# Patient Record
Sex: FEMALE | Race: WHITE | NOT HISPANIC OR LATINO | Employment: FULL TIME | URBAN - METROPOLITAN AREA
[De-identification: names, ages, dates, MRNs, and addresses within clinical notes are randomized per-mention and may not be internally consistent; named-entity substitution may affect disease eponyms.]

---

## 2023-12-15 ENCOUNTER — ANCILLARY PROCEDURE (OUTPATIENT)
Dept: RADIOLOGY | Facility: CLINIC | Age: 58
End: 2023-12-15
Payer: COMMERCIAL

## 2023-12-15 ENCOUNTER — OFFICE VISIT (OUTPATIENT)
Dept: ORTHOPEDIC SURGERY | Facility: CLINIC | Age: 58
End: 2023-12-15
Payer: COMMERCIAL

## 2023-12-15 DIAGNOSIS — S93.492A SPRAIN OF ANTERIOR TALOFIBULAR LIGAMENT, LEFT, INITIAL ENCOUNTER: ICD-10-CM

## 2023-12-15 DIAGNOSIS — S92.352A CLOSED FRACTURE OF BASE OF FIFTH METATARSAL BONE, LEFT, INITIAL ENCOUNTER: ICD-10-CM

## 2023-12-15 DIAGNOSIS — S86.302A PERONEAL TENDON INJURY, LEFT, INITIAL ENCOUNTER: Primary | ICD-10-CM

## 2023-12-15 PROCEDURE — 73610 X-RAY EXAM OF ANKLE: CPT | Mod: LT,FY

## 2023-12-15 PROCEDURE — 73630 X-RAY EXAM OF FOOT: CPT | Mod: LT,FY

## 2023-12-15 PROCEDURE — 1036F TOBACCO NON-USER: CPT | Performed by: STUDENT IN AN ORGANIZED HEALTH CARE EDUCATION/TRAINING PROGRAM

## 2023-12-15 PROCEDURE — 99213 OFFICE O/P EST LOW 20 MIN: CPT | Performed by: STUDENT IN AN ORGANIZED HEALTH CARE EDUCATION/TRAINING PROGRAM

## 2023-12-15 RX ORDER — VALACYCLOVIR HYDROCHLORIDE 1 G/1
TABLET, FILM COATED ORAL
COMMUNITY
Start: 2023-05-01 | End: 2024-01-12 | Stop reason: WASHOUT

## 2023-12-15 RX ORDER — ALPRAZOLAM 1 MG/1
0.5 TABLET ORAL
COMMUNITY
Start: 2023-11-22 | End: 2024-02-20

## 2023-12-15 RX ORDER — TIZANIDINE 2 MG/1
2-4 TABLET ORAL
COMMUNITY
Start: 2023-01-17

## 2023-12-15 RX ORDER — APREMILAST 10-20-30MG
KIT ORAL
COMMUNITY
Start: 2023-08-11

## 2023-12-15 RX ORDER — TRAMADOL HYDROCHLORIDE 50 MG/1
50 TABLET ORAL EVERY 8 HOURS PRN
COMMUNITY
Start: 2023-10-09

## 2023-12-15 RX ORDER — CETIRIZINE HYDROCHLORIDE 10 MG/1
10 TABLET ORAL DAILY
COMMUNITY
Start: 2023-06-08

## 2023-12-15 RX ORDER — ALBUTEROL SULFATE 90 UG/1
1-2 AEROSOL, METERED RESPIRATORY (INHALATION) EVERY 4 HOURS PRN
COMMUNITY
Start: 2023-06-23

## 2023-12-15 RX ORDER — GABAPENTIN 300 MG/1
300 CAPSULE ORAL NIGHTLY
COMMUNITY
Start: 2023-11-28

## 2023-12-15 ASSESSMENT — PAIN SCALES - GENERAL: PAINLEVEL_OUTOF10: 3

## 2023-12-15 ASSESSMENT — PAIN DESCRIPTION - DESCRIPTORS: DESCRIPTORS: ACHING;SORE

## 2023-12-15 ASSESSMENT — PAIN - FUNCTIONAL ASSESSMENT: PAIN_FUNCTIONAL_ASSESSMENT: 0-10

## 2023-12-15 NOTE — PROGRESS NOTES
ORTHOPAEDIC SURGERY OUTPATIENT PROGRESS NOTE    Chief Complaint:  Left foot pain    History Of Present Illness  58-year-old female presenting for evaluation of left foot pain as a referral from Dr. Chow. Patient reports that on 06/25/2023 she stepped awkwardly off her deck while carrying her 2-year-old granddaughter and suffered an inversion type injury to her foot and ankle. She was able to bear weight but with great difficulty after. She experienced severe, 10 out of 10 pain. She currently rates her pain a 6-7 out of 10. She has been taking Motrin without relief. She was seen in urgent care setting where x-rays were obtained and the patient was made protected weightbearing with crutches. Patient states that her pain fluctuates, usually worsening by the end of the day with associated swelling that has overall improved. Her bruising has also improved.     Patient has psoriatic arthritis in her feet. 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     Patient is a current non-smoker, nondiabetic and does not use any anticoagulation.     Please see detailed patient history sheet for further information.     08/11/2023: Patient returns for follow-up of her left foot and ankle pain. Patient reports minimal pain. She has been compliant with weightbearing restrictions utilizing a walking boot and a compression sock. She has only had an occasional ache or discomfort, typically with increased walking or standing.     09/22/2023: Patient returns for follow-up of her left foot and ankle pain. Overall, her foot pain has declined considerably. She has successfully transition back to regular shoewear. She has noticed some burning pain radiating down the outside of her foot and is having continued difficulty with walking down stairs with a pain on the outside of her ankle. Pain is rated as mild to moderate. She does not have any current limitations in her activities of daily  living, she continues to run her [employer] business and is on her feet extensively throughout the workday. She has been performing an HEP and is looking for additional exercises for mobility.     12/15/2023: Patient returns for follow-up of her left foot and ankle pain.  Patient reports minimal pain overlying the midfoot and outside of her foot.  She has noticed more aching and soreness towards the back of her ankle.  This is rated as 3 out of 10 and is worsened with prolonged standing and walking.  She is not reporting any significant burning pain on evaluation today.  She denies any interval trauma.  She does not report any pallavi mechanical symptoms including ankle catching, locking, giving out or giving way.  Patient reports more pain and discomfort over uneven surfaces then going up and down stairs.  Past Medical History  History reviewed. No pertinent past medical history.    Surgical History  Recent Surgeries in Orthopaedic Surgery            No cases to display             Social History  Social History     Socioeconomic History    Marital status:      Spouse name: None    Number of children: None    Years of education: None    Highest education level: None   Occupational History    None   Tobacco Use    Smoking status: Former     Types: Cigarettes    Smokeless tobacco: Never   Substance and Sexual Activity    Alcohol use: None    Drug use: None    Sexual activity: None   Other Topics Concern    None   Social History Narrative    None     Social Determinants of Health     Financial Resource Strain: Not on file   Food Insecurity: Not on file   Transportation Needs: Not on file   Physical Activity: Not on file   Stress: Not on file   Social Connections: Not on file   Intimate Partner Violence: Not on file   Housing Stability: Not on file       Family History  No family history on file.     Allergies  Allergies   Allergen Reactions    Penicillins Hives       Review of Systems  REVIEW OF  SYSTEMS  Constitutional: no unplanned weight loss  Psychiatric: no suicidal ideation  ENT: no vision changes, no sinus problems  Pulmonary: no shortness of breath during office visit today  Lymphatic: no enlarged lymph nodes  Cardiovascular: no chest pain or shortness of breath during office visit today  Gastrointestinal: no stomach problems  Genitourinary: no dysuria   Skin: no rashes  Endocrine: no thyroid problems  Neurological: no headache, no numbness  Hematological: no easy bruising  Musculoskeletal: Left ankle pain     Physical Exam  PHYSICAL EXAMINATION  Constitutional Exam: well developed and well nourished  Psychiatric Exam: alert and oriented, appropriate mood and behavior  Eye Exam: EOMI  Pulmonary Exam: breathing non-labored, no apparent distress  Lymphatic exam: no appreciable lymphadenopathy in the lower extremities  Cardiovascular exam: RRR to peripheral palpation, DP pulses 2+, PT 2+, toes are pink with good capillary refill, no pitting edema  Skin exam: no open lesions, rashes, abrasions or ulcerations  Neurological exam: sensation to light touch intact in both lower extremities in peripheral and dermatomal distributions (except for any abnormalities noted in musculoskeletal exam)    Musculoskeletal exam: Left lower extremity examination.  Patient nontender to palpation about the base of the fifth metatarsal.  Ankle pain localized to the peroneal tendons.  She is focally tender to palpation overlying the tendons, distal to the fibula and with sensitivity about the presumed peroneal tubercle.  Patient nontender to palpation overlying the ATFL and CFL.  Patient has supple and pain-free ankle, subtalar and midtarsal joint range of motion.  Patient has sensation intact light touch grossly in the saphenous, sural, superficial peroneal, deep peroneal and tibial nerve distribution.  She has 5-5 strength in plantarflexion, dorsiflexion, EHL and inversion as well as eversion.  Resisted eversion recreates  her typical pain.  Patient has 2+ DP/PT pulse palpated.  She has no obvious peroneal tendon subluxation/dislocation with circumduction testing.     Last Recorded Vitals  There were no vitals taken for this visit.    Laboratory Results  No results found for this or any previous visit (from the past 24 hour(s)).     Radiology Results  X-ray imaging 3 view weightbearing left foot and ankle reviewed 12/15/2023 and independently evaluated by me demonstrates no acute fracture or dislocation.  There has been interval healing about fifth metatarsal fracture as well as dorsal navicular.  Ankle mortise remains well aligned.    Assessment/Plan:  58-year-old female who my impression has clinical and radiographic evidence of healed left fifth metatarsal fracture in the setting of resolving SPN neuritis following ankle sprain with persistence of pain likely secondary to peroneal tendon injury about the peroneal tubercle.  I have reviewed the diagnosis and treatment options extensively with the patient.  In my impression, the patient may continue weightbearing as tolerated in her left lower extremity.  Due to the persistence of her pain following her injury from 06/25/2023 I will order an MRI without contrast of her left ankle to evaluate her lateral ligament complex and rule out peroneal tendon injury.  I will plan to see the patient back in approximately 3 weeks to discuss next treatment steps.  Upon return, patient would not require further imaging.    Sb Villafuerte MD, SHENG  Department of Orthopaedic Surgery  [facility]    The diagnosis and treatment plan were reviewed with the patient. All questions were answered. The patient verbalized understanding of the treatment plan. There were no barriers to understanding identified.    Note dictated with [employer] software.  Completed without full type editing and sent to avoid delay.

## 2023-12-22 ENCOUNTER — APPOINTMENT (OUTPATIENT)
Dept: ORTHOPEDIC SURGERY | Facility: CLINIC | Age: 58
End: 2023-12-22
Payer: COMMERCIAL

## 2024-01-02 ENCOUNTER — ANCILLARY PROCEDURE (OUTPATIENT)
Dept: RADIOLOGY | Facility: CLINIC | Age: 59
End: 2024-01-02
Payer: COMMERCIAL

## 2024-01-02 DIAGNOSIS — S86.302A PERONEAL TENDON INJURY, LEFT, INITIAL ENCOUNTER: ICD-10-CM

## 2024-01-02 PROCEDURE — 73721 MRI JNT OF LWR EXTRE W/O DYE: CPT | Mod: LT

## 2024-01-12 ENCOUNTER — OFFICE VISIT (OUTPATIENT)
Dept: ORTHOPEDIC SURGERY | Facility: CLINIC | Age: 59
End: 2024-01-12
Payer: COMMERCIAL

## 2024-01-12 DIAGNOSIS — M25.372 LEFT ANKLE INSTABILITY: ICD-10-CM

## 2024-01-12 DIAGNOSIS — S86.302A PERONEAL TENDON INJURY, LEFT, INITIAL ENCOUNTER: ICD-10-CM

## 2024-01-12 DIAGNOSIS — S93.492A SPRAIN OF ANTERIOR TALOFIBULAR LIGAMENT, LEFT, INITIAL ENCOUNTER: Primary | ICD-10-CM

## 2024-01-12 PROCEDURE — 99213 OFFICE O/P EST LOW 20 MIN: CPT | Performed by: STUDENT IN AN ORGANIZED HEALTH CARE EDUCATION/TRAINING PROGRAM

## 2024-01-12 PROCEDURE — 1036F TOBACCO NON-USER: CPT | Performed by: STUDENT IN AN ORGANIZED HEALTH CARE EDUCATION/TRAINING PROGRAM

## 2024-01-12 NOTE — PROGRESS NOTES
ORTHOPAEDIC SURGERY OUTPATIENT PROGRESS NOTE    Chief Complaint:  Left foot pain    History Of Present Illness  58-year-old female presenting for evaluation of left foot pain as a referral from Dr. Chow. Patient reports that on 06/25/2023 she stepped awkwardly off her deck while carrying her 2-year-old granddaughter and suffered an inversion type injury to her foot and ankle. She was able to bear weight but with great difficulty after. She experienced severe, 10 out of 10 pain. She currently rates her pain a 6-7 out of 10. She has been taking Motrin without relief. She was seen in urgent care setting where x-rays were obtained and the patient was made protected weightbearing with crutches. Patient states that her pain fluctuates, usually worsening by the end of the day with associated swelling that has overall improved. Her bruising has also improved.     Patient has psoriatic arthritis in her feet. 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     Patient is a current non-smoker, nondiabetic and does not use any anticoagulation.     Please see detailed patient history sheet for further information.     08/11/2023: Patient returns for follow-up of her left foot and ankle pain. Patient reports minimal pain. She has been compliant with weightbearing restrictions utilizing a walking boot and a compression sock. She has only had an occasional ache or discomfort, typically with increased walking or standing.     09/22/2023: Patient returns for follow-up of her left foot and ankle pain. Overall, her foot pain has declined considerably. She has successfully transition back to regular shoewear. She has noticed some burning pain radiating down the outside of her foot and is having continued difficulty with walking down stairs with a pain on the outside of her ankle. Pain is rated as mild to moderate. She does not have any current limitations in her activities of daily  living, she continues to run her [employer] business and is on her feet extensively throughout the workday. She has been performing an HEP and is looking for additional exercises for mobility.     12/15/2023: Patient returns for follow-up of her left foot and ankle pain.  Patient reports minimal pain overlying the midfoot and outside of her foot.  She has noticed more aching and soreness towards the back of her ankle.  This is rated as 3 out of 10 and is worsened with prolonged standing and walking.  She is not reporting any significant burning pain on evaluation today.  She denies any interval trauma.  She does not report any pallavi mechanical symptoms including ankle catching, locking, giving out or giving way.  Patient reports more pain and discomfort over uneven surfaces then going up and down stairs.    01/12/2024: Patient returns for follow-up of her left foot and ankle pain.  Patient reports that she had a significant decrease in her symptoms as a transition out of the busy season from the holidays.  She is currently reporting 2 out of 10 pain but during the holidays was closer to 4-5 out of 10.  She has had minimal to any foot pain but has had more persistent ankle pain.  She is reporting mechanical symptoms including giving out and the sensation that she does not trust her ankle.  Patient denies any new trauma or numbness, tingling and weakness.    Past Medical History  No past medical history on file.    Surgical History  Recent Surgeries in Orthopaedic Surgery            No cases to display             Social History  Social History     Socioeconomic History    Marital status:      Spouse name: Not on file    Number of children: Not on file    Years of education: Not on file    Highest education level: Not on file   Occupational History    Not on file   Tobacco Use    Smoking status: Former     Types: Cigarettes    Smokeless tobacco: Never   Substance and Sexual Activity    Alcohol use: Not on file     Drug use: Not on file    Sexual activity: Not on file   Other Topics Concern    Not on file   Social History Narrative    Not on file     Social Determinants of Health     Financial Resource Strain: Not on file   Food Insecurity: Not on file   Transportation Needs: Not on file   Physical Activity: Not on file   Stress: Not on file   Social Connections: Not on file   Intimate Partner Violence: Not on file   Housing Stability: Not on file       Family History  No family history on file.     Allergies  Allergies   Allergen Reactions    Penicillins Hives       Review of Systems  REVIEW OF SYSTEMS  Constitutional: no unplanned weight loss  Psychiatric: no suicidal ideation  ENT: no vision changes, no sinus problems  Pulmonary: no shortness of breath during office visit today  Lymphatic: no enlarged lymph nodes  Cardiovascular: no chest pain or shortness of breath during office visit today  Gastrointestinal: no stomach problems  Genitourinary: no dysuria   Skin: no rashes  Endocrine: no thyroid problems  Neurological: no headache, no numbness  Hematological: no easy bruising  Musculoskeletal: Left ankle pain     Physical Exam  PHYSICAL EXAMINATION  Constitutional Exam: well developed and well nourished  Psychiatric Exam: alert and oriented, appropriate mood and behavior  Eye Exam: EOMI  Pulmonary Exam: breathing non-labored, no apparent distress  Lymphatic exam: no appreciable lymphadenopathy in the lower extremities  Cardiovascular exam: RRR to peripheral palpation, DP pulses 2+, PT 2+, toes are pink with good capillary refill, no pitting edema  Skin exam: no open lesions, rashes, abrasions or ulcerations  Neurological exam: sensation to light touch intact in both lower extremities in peripheral and dermatomal distributions (except for any abnormalities noted in musculoskeletal exam)    Musculoskeletal exam: Left lower extremity examination.  Patient continues to be nontender to palpation about the base of the fifth  metatarsal.  Her typical ankle pain is localized to the peroneal tendons, she is focally tender to palpation there.  Patient has a subluxation left ankle dislocation of the peroneal tendons with circumduction testing.  She has minimal tenderness to palpation overlying the ATFL and CFL.  Patient has pain-free and supple ankle, subtalar midtarsal joint range of motion. Patient has sensation intact light touch grossly in the saphenous, sural, superficial peroneal, deep peroneal and tibial nerve distribution.  She has 5-5 strength in plantarflexion, dorsiflexion, EHL and inversion as well as eversion.  Resisted eversion recreates her typical pain.  Patient has 2+ DP/PT pulse palpated.  Patient has a positive talar tilt and anterior drawer test.     Last Recorded Vitals  There were no vitals taken for this visit.    Laboratory Results  No results found for this or any previous visit (from the past 24 hour(s)).     Radiology Results  MRI left ankle without contrast reviewed from 01/02/2024 and independently evaluated by me demonstrates attenuation of ATFL, CFL with peroneal tendon tenosynovitis and concern for peroneus longus tear.  Mild increased signal within the fifth metatarsal base.    Assessment/Plan:  58-year-old female who in my impression has both clinical and radiographic evidence of healing of left fifth metatarsal fracture in the setting of chronic ankle instability with MRI findings consistent with attenuation of ATFL, CFL and peroneal tendon injury.  I have reviewed the diagnosis and treatment options extensively with the patient.  Based on the patient's current level of symptoms which are minimal I would recommend that she continue to weight-bear as tolerated utilizing a brace and anti-inflammatory pain medication or cream for symptomatic relief.  I discussed that she could develop worsening pain or instability and that she may require more definitive treatment down the road.  I will plan to see the patient  back on an as-needed basis, I encouraged the patient to contact the office if he develops any new pain, worsening symptoms or she has any further questions.  Upon return, patient would not require further imaging.    Sb Villafuerte MD, SHENG  Department of Orthopaedic Surgery  [facility]    The diagnosis and treatment plan were reviewed with the patient. All questions were answered. The patient verbalized understanding of the treatment plan. There were no barriers to understanding identified.    Note dictated with [employer] software.  Completed without full type editing and sent to avoid delay.